# Patient Record
Sex: MALE | Race: WHITE | NOT HISPANIC OR LATINO | URBAN - METROPOLITAN AREA
[De-identification: names, ages, dates, MRNs, and addresses within clinical notes are randomized per-mention and may not be internally consistent; named-entity substitution may affect disease eponyms.]

---

## 2018-12-04 ENCOUNTER — TELEPHONE (OUTPATIENT)
Dept: FAMILY MEDICINE | Facility: CLINIC | Age: 53
End: 2018-12-04

## 2018-12-04 NOTE — TELEPHONE ENCOUNTER
Attempted to call and left message to see how pt is doing after hospital stay and for pt to call clinic back. Will try again later. Brayden BOLESA

## 2020-03-04 ENCOUNTER — OFFICE VISIT - HEALTHEAST (OUTPATIENT)
Dept: FAMILY MEDICINE | Facility: CLINIC | Age: 55
End: 2020-03-04

## 2020-03-04 DIAGNOSIS — Z91.148 NONCOMPLIANCE WITH MEDICATIONS: ICD-10-CM

## 2020-03-04 DIAGNOSIS — I50.9 CHF (CONGESTIVE HEART FAILURE) (H): ICD-10-CM

## 2020-03-04 LAB
ALBUMIN SERPL-MCNC: 3.3 G/DL (ref 3.5–5)
ALP SERPL-CCNC: 114 U/L (ref 45–120)
ALT SERPL W P-5'-P-CCNC: 16 U/L (ref 0–45)
ANION GAP SERPL CALCULATED.3IONS-SCNC: 9 MMOL/L (ref 5–18)
AST SERPL W P-5'-P-CCNC: 37 U/L (ref 0–40)
BILIRUB SERPL-MCNC: 2.6 MG/DL (ref 0–1)
BUN SERPL-MCNC: 11 MG/DL (ref 8–22)
CALCIUM SERPL-MCNC: 8.9 MG/DL (ref 8.5–10.5)
CHLORIDE BLD-SCNC: 104 MMOL/L (ref 98–107)
CO2 SERPL-SCNC: 28 MMOL/L (ref 22–31)
CREAT SERPL-MCNC: 0.84 MG/DL (ref 0.7–1.3)
ERYTHROCYTE [DISTWIDTH] IN BLOOD BY AUTOMATED COUNT: 17.1 % (ref 11–14.5)
GFR SERPL CREATININE-BSD FRML MDRD: >60 ML/MIN/1.73M2
GLUCOSE BLD-MCNC: 71 MG/DL (ref 70–125)
HBA1C MFR BLD: 6.5 % (ref 3.5–6)
HCT VFR BLD AUTO: 44.2 % (ref 40–54)
HGB BLD-MCNC: 14 G/DL (ref 14–18)
MCH RBC QN AUTO: 31.5 PG (ref 27–34)
MCHC RBC AUTO-ENTMCNC: 31.7 G/DL (ref 32–36)
MCV RBC AUTO: 100 FL (ref 80–100)
PLATELET # BLD AUTO: 235 THOU/UL (ref 140–440)
PMV BLD AUTO: 10.7 FL (ref 8.5–12.5)
POTASSIUM BLD-SCNC: 4.3 MMOL/L (ref 3.5–5)
PROT SERPL-MCNC: 6.6 G/DL (ref 6–8)
RBC # BLD AUTO: 4.44 MILL/UL (ref 4.4–6.2)
SODIUM SERPL-SCNC: 141 MMOL/L (ref 136–145)
WBC: 5.2 THOU/UL (ref 4–11)

## 2020-03-04 ASSESSMENT — MIFFLIN-ST. JEOR: SCORE: 1701.93

## 2020-03-05 ENCOUNTER — COMMUNICATION - HEALTHEAST (OUTPATIENT)
Dept: FAMILY MEDICINE | Facility: CLINIC | Age: 55
End: 2020-03-05

## 2020-03-26 ENCOUNTER — COMMUNICATION - HEALTHEAST (OUTPATIENT)
Dept: INTERNAL MEDICINE | Facility: CLINIC | Age: 55
End: 2020-03-26

## 2020-03-26 ENCOUNTER — OFFICE VISIT - HEALTHEAST (OUTPATIENT)
Dept: INTERNAL MEDICINE | Facility: CLINIC | Age: 55
End: 2020-03-26

## 2020-03-26 DIAGNOSIS — I50.9 CHF (CONGESTIVE HEART FAILURE) (H): ICD-10-CM

## 2020-03-26 DIAGNOSIS — I50.23 ACUTE ON CHRONIC SYSTOLIC CONGESTIVE HEART FAILURE (H): ICD-10-CM

## 2020-03-26 RX ORDER — METOPROLOL SUCCINATE 25 MG/1
37.5 TABLET, EXTENDED RELEASE ORAL DAILY
Qty: 90 TABLET | Refills: 0 | Status: SHIPPED | OUTPATIENT
Start: 2020-03-26

## 2020-03-26 RX ORDER — LISINOPRIL 2.5 MG/1
2.5 TABLET ORAL DAILY
Qty: 90 TABLET | Refills: 0 | Status: SHIPPED | OUTPATIENT
Start: 2020-03-26

## 2020-03-26 RX ORDER — FUROSEMIDE 80 MG
80 TABLET ORAL DAILY
Qty: 90 TABLET | Refills: 0 | Status: SHIPPED | OUTPATIENT
Start: 2020-03-26

## 2020-03-26 RX ORDER — SPIRONOLACTONE 25 MG/1
12.5 TABLET ORAL DAILY
Qty: 90 TABLET | Refills: 0 | Status: SHIPPED | OUTPATIENT
Start: 2020-03-26

## 2021-06-04 VITALS
OXYGEN SATURATION: 98 % | DIASTOLIC BLOOD PRESSURE: 56 MMHG | TEMPERATURE: 97.7 F | BODY MASS INDEX: 26.63 KG/M2 | SYSTOLIC BLOOD PRESSURE: 138 MMHG | WEIGHT: 190.2 LBS | HEART RATE: 95 BPM | HEIGHT: 71 IN | RESPIRATION RATE: 30 BRPM

## 2021-06-06 NOTE — TELEPHONE ENCOUNTER
4th attempt to contact patient      Left message to call back for: Linus      Information to relay to patient:  LMTCB      Please advise patient as below from Dr. Chowdary     Please call patient and let him know his blood work (kidney, electrolytes, and blood counts) look good. One of his liver test is elevated like it was a year ago when he was in the hospital. He should be sure to follow closely with his new primary care provider that he is seeing on Monday. He will likely reevaluate his blood work now that he has started on his medications again.

## 2021-06-06 NOTE — TELEPHONE ENCOUNTER
Left message to call back for: Linus  Information to relay to patient:  Please notify patient of message when he calls back.  Thank you.

## 2021-06-06 NOTE — TELEPHONE ENCOUNTER
----- Message from Sridevi Chowdary MD sent at 3/4/2020  9:21 PM CST -----  Please call patient and let him know his blood work (kidney, electrolytes, and blood counts) look good. One of his liver test is elevated like it was a year ago when he was in the hospital. He should be sure to follow closely with his new primary care provider that he is seeing on Monday. He will likely reevaluate his blood work now that he has started on his medications again.

## 2021-06-06 NOTE — TELEPHONE ENCOUNTER
3rd attempt to contact patient     Left message to call back for: Linus     Information to relay to patient:  LMTCB     Please advise patient as below from Dr. Chowdary    Please call patient and let him know his blood work (kidney, electrolytes, and blood counts) look good. One of his liver test is elevated like it was a year ago when he was in the hospital. He should be sure to follow closely with his new primary care provider that he is seeing on Monday. He will likely reevaluate his blood work now that he has started on his medications again.

## 2021-06-06 NOTE — PROGRESS NOTES
Assessment / Impression     1. CHF (congestive heart failure) (H)  furosemide (LASIX) 80 MG tablet    metoprolol succinate (TOPROL-XL) 25 MG    spironolactone (ALDACTONE) 25 MG tablet    lisinopriL (PRINIVIL,ZESTRIL) 2.5 MG tablet    Comprehensive Metabolic Panel    Glycosylated Hemoglobin A1c    HM2(CBC w/o Differential)   2. Noncompliance with medications         Plan:     1 and 2.   Spent significant time reviewing with the patient the importance of medication compliance.  Patient seemed to express understanding but has no interest in further work-up or referral to cardiology at this time.  Explained to the patient I am reluctant to give him more than 1 month of refills to ensure that he returns for evaluation and to establish care with a primary care provider.  He is agreed to laboratory work-up today with a comprehensive metabolic panel, glycosylated hemoglobin,  and a CBC.  Reviewed with patient that based on exam and vital signs I do not believe he needs immediate inpatient evaluation and work-up but I am concerned about his risk for sudden death given his noncompliance with medications or follow-up recommendations from the health care professionals that have been overseeing his care.  He was given the referral from a friend to see Dr. Jon at our [place] [facility].  We have helped facilitate an establish care appointment with Dr. Jon in follow-up for his congestive heart failure.    Subjective:      HPI: Eliseo Oglesby is a 54 y.o. 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  Upon chart review patient was noted to have hospitalization in January 2019 for  "shortness of breath and left AMA.  Patient states for the past couple of days he has just run out of his medications and brings in bottles showing prescriptions for spironolactone, furosemide, metoprolol, and lisinopril.  Patient does report only intermittent use of these medications but states when he runs out of his water pill he experiences significant weight gain and believes he is had approximately 30 pound weight gain but does not know over what timeframe.  He states there is nothing that anyone can do for his heart and is not interested in smoking cessation or changing his lifestyle and thus is not wanting to receive any more diagnostic work-up at this time.  He states he lives in his mother's houses in [place] near our [place] [facility].  He states he has been unable to work for years because of these above diagnoses.  Denies any acute chest pain or acute shortness of breath.       Review of Systems  Pertinent items are noted in HPI.  All other systems are reviewed and are negative      Objective:     /56 (Patient Site: Right Arm, Patient Position: Sitting, Cuff Size: Adult Regular)   Pulse 95   Temp 97.7  F (36.5  C) (Oral)   Resp (!) 30   Ht 5' 10.5\" (1.791 m)   Wt 190 lb 3.2 oz (86.3 kg)   SpO2 98%   BMI 26.91 kg/m    Physical Examination: General appearance - alert, relative poor hygiene and poor dentition.  Breathing comfortably on room air with no obvious respiratory distress.  Eyes: pupils equal and reactive, extraocular eye movements intact  Mouth: mucous membranes moist, pharynx normal without lesions  Neck: supple, no significant adenopathy  Lymphatics: no palpable lymphadenopathy  Chest: clear to auscultation with some decreased breath sounds at the bases and occasional expiratory wheezing throughout.  No rhonchi or crackles.  Heart: normal rate, regular rhythm, normal S1, S2, no murmurs, rubs, clicks or gallops  Abdomen: soft, nontender, nondistended, no masses or " organomegaly  Neurological: alert, oriented, normal speech, no focal findings or movement disorder noted  Extremities: 2+ pitting edema bilaterally that extends to his knees  Psychiatric: No of evidence of vania or pressured speech.  No hallucinations or delusions.

## 2021-06-07 NOTE — TELEPHONE ENCOUNTER
Who is calling:  Patient  Reason for Call:  Patient missed appointment/was not willing to make a new appointment/hung up before I could make new appointment/wanted it 5 minutes from his call. Just wanted to let you know he did call back.  Date of last appointment with primary care: NA  Okay to leave a detailed message: No

## 2021-06-16 PROBLEM — I25.10 CORONARY ARTERY CALCIFICATION SEEN ON CT SCAN: Chronic | Status: ACTIVE | Noted: 2018-09-12

## 2021-06-16 PROBLEM — R74.01 TRANSAMINITIS: Status: ACTIVE | Noted: 2018-11-30

## 2021-06-16 PROBLEM — I42.0 DILATED CARDIOMYOPATHY (H): Chronic | Status: ACTIVE | Noted: 2018-09-09

## 2021-06-16 PROBLEM — E83.42 HYPOMAGNESEMIA: Status: ACTIVE | Noted: 2018-09-09

## 2021-06-16 PROBLEM — I50.23 ACUTE ON CHRONIC SYSTOLIC CONGESTIVE HEART FAILURE (H): Status: ACTIVE | Noted: 2018-09-09

## 2021-06-16 PROBLEM — N17.9 AKI (ACUTE KIDNEY INJURY) (H): Status: ACTIVE | Noted: 2018-11-30

## 2021-06-16 PROBLEM — I47.29 NONSUSTAINED VENTRICULAR TACHYCARDIA (H): Status: ACTIVE | Noted: 2018-09-11

## 2021-06-16 PROBLEM — E11.9 DIABETES MELLITUS, TYPE 2 (H): Status: ACTIVE | Noted: 2021-04-14

## 2021-06-16 PROBLEM — Z53.29 LEFT AGAINST MEDICAL ADVICE: Status: ACTIVE | Noted: 2018-11-30

## 2021-06-20 NOTE — LETTER
Letter by Bev Arellano CMA at      Author: Bev Arellano CMA Service: -- Author Type: --    Filed:  Encounter Date: 3/5/2020 Status: (Other)         Eliseo Oglesby  [address] [place] [address]      March 13, 2020      Dear Mr. Oglesby,    We have been unable to contact you by phone about your recent test results, here is the message from your provider from March 5th:    Please advise patient as below from Dr. Chowdary     Please call patient and let him know his blood work (kidney, electrolytes, and blood counts) look good. One of his liver test is elevated like it was a year ago when he was in the hospital. He should be sure to follow closely with his new primary care provider that he is seeing on Monday. He will likely reevaluate his blood work now that he has started on his medications again.       Sincerely,      Your health care team at [facility]

## 2021-07-14 PROBLEM — I50.9 ACUTE CHF (H): Status: RESOLVED | Noted: 2018-11-27 | Resolved: 2018-11-30
